# Patient Record
Sex: FEMALE | URBAN - METROPOLITAN AREA
[De-identification: names, ages, dates, MRNs, and addresses within clinical notes are randomized per-mention and may not be internally consistent; named-entity substitution may affect disease eponyms.]

---

## 2019-08-14 ENCOUNTER — TELEPHONE (OUTPATIENT)
Dept: OBSTETRICS AND GYNECOLOGY | Age: 59
End: 2019-08-14

## 2019-08-14 NOTE — TELEPHONE ENCOUNTER
Current Dr Babin pt (Josereyna Lamont) is calling in regards to her aunt as she is needing to est care for an annual exam, h/o ovarian cysts and current pelvic pain/discomfort. Pt requests to be seen soon if possible.     Pt is reported to have [employer].     Sending to Nicky for Dr Babin to review.